# Patient Record
Sex: MALE | Race: WHITE | NOT HISPANIC OR LATINO | Employment: FULL TIME | URBAN - METROPOLITAN AREA
[De-identification: names, ages, dates, MRNs, and addresses within clinical notes are randomized per-mention and may not be internally consistent; named-entity substitution may affect disease eponyms.]

---

## 2024-09-15 ENCOUNTER — HOSPITAL ENCOUNTER (EMERGENCY)
Facility: HOSPITAL | Age: 24
Discharge: HOME | End: 2024-09-15
Attending: STUDENT IN AN ORGANIZED HEALTH CARE EDUCATION/TRAINING PROGRAM
Payer: COMMERCIAL

## 2024-09-15 VITALS
HEIGHT: 68 IN | WEIGHT: 154 LBS | HEART RATE: 56 BPM | TEMPERATURE: 98 F | RESPIRATION RATE: 20 BRPM | SYSTOLIC BLOOD PRESSURE: 129 MMHG | DIASTOLIC BLOOD PRESSURE: 100 MMHG | OXYGEN SATURATION: 96 % | BODY MASS INDEX: 23.34 KG/M2
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
## 2024-09-15 PROCEDURE — 63600175 PHARM REV CODE 636 W HCPCS: Performed by: STUDENT IN AN ORGANIZED HEALTH CARE EDUCATION/TRAINING PROGRAM

## 2024-09-15 PROCEDURE — 99284 EMERGENCY DEPT VISIT MOD MDM: CPT | Mod: 25

## 2024-09-15 PROCEDURE — 90471 IMMUNIZATION ADMIN: CPT | Performed by: STUDENT IN AN ORGANIZED HEALTH CARE EDUCATION/TRAINING PROGRAM

## 2024-09-15 PROCEDURE — 90715 TDAP VACCINE 7 YRS/> IM: CPT | Performed by: STUDENT IN AN ORGANIZED HEALTH CARE EDUCATION/TRAINING PROGRAM

## 2024-09-15 RX ORDER — AMOXICILLIN AND CLAVULANATE POTASSIUM 875; 125 MG/1; MG/1
1 TABLET, FILM COATED ORAL 2 TIMES DAILY
Qty: 14 TABLET | Refills: 0 | Status: SHIPPED | OUTPATIENT
Start: 2024-09-15 | End: 2024-09-22

## 2024-09-15 RX ADMIN — TETANUS TOXOID, REDUCED DIPHTHERIA TOXOID AND ACELLULAR PERTUSSIS VACCINE, ADSORBED 0.5 ML: 5; 2.5; 8; 8; 2.5 SUSPENSION INTRAMUSCULAR at 01:09

## 2024-09-15 NOTE — Clinical Note
"Godwin Gaytan" Ana was seen and treated in our emergency department on 9/15/2024.  He may return with no restrictions on 09/15/2024.  May return to full duty     Sincerely,      Delicia Fairchild RN RN    "

## 2024-09-15 NOTE — ED NOTES
[place] police notified that pt was being discharged at this time.  Instructed pt to call police department later today if they do not contact you.  Instructed that case number is [id number].  Pt verbalized understanding.

## 2024-09-15 NOTE — ED NOTES
Called to [place] [employer] about dog bite in [facility].  Spoke with Eve who said she would reach out to animal control and call ER back

## 2024-09-15 NOTE — ED PROVIDER NOTES
Encounter Date: 9/15/2024       History     Chief Complaint   Patient presents with    Animal Bite     Dog bite to left lower leg happened prior to arrival. Abrasion/scrap noted to back of patient's left lower leg.      Patient is a 23-year-old male no significant past medical history presented to the ER today due to a dog bite.  He states he works in [employer] and was doing a call at a house when a pit bull bit his leg.  Area of injury is left lower leg.  He states it grabbed his jeans and has a superficial abrasion to the lateral aspect of the left lower leg.  He is unsure when his last tetanus shot was and states that his work requires him to come in to be evaluated.  Animal control was contacted and the plan is for quarantine of the dog.  Patient states he was unsure of the dog's vaccination status.  Denies any complaints this time.      Review of patient's allergies indicates:  No Known Allergies  History reviewed. No pertinent past medical history.  History reviewed. No pertinent surgical history.  No family history on file.  Social History     Tobacco Use    Smoking status: Never    Smokeless tobacco: Never   Substance Use Topics    Alcohol use: Never    Drug use: Never     Review of Systems   Constitutional:  Negative for chills, fatigue and fever.   HENT:  Negative for congestion, sore throat and trouble swallowing.    Eyes:  Negative for pain and visual disturbance.   Respiratory:  Negative for cough, shortness of breath and wheezing.    Cardiovascular:  Negative for chest pain and palpitations.   Gastrointestinal:  Negative for abdominal pain, blood in stool, constipation, diarrhea, nausea and vomiting.   Genitourinary:  Negative for dysuria and hematuria.   Musculoskeletal:  Negative for back pain and myalgias.   Skin:  Positive for wound. Negative for rash.   Neurological:  Negative for seizures, syncope and headaches.   Psychiatric/Behavioral:  Negative for confusion. The patient is not  nervous/anxious.        Physical Exam     Initial Vitals [09/15/24 1254]   BP Pulse Resp Temp SpO2   (!) 129/100 (!) 56 20 98.1 °F (36.7 °C) 96 %      MAP       --         Physical Exam    Nursing note and vitals reviewed.  Constitutional: He appears well-developed and well-nourished. No distress.   HENT:   Head: Normocephalic and atraumatic.   Eyes: Conjunctivae and EOM are normal. Right eye exhibits no discharge. Left eye exhibits no discharge. No scleral icterus.   Neck: No tracheal deviation present.   Normal range of motion.  Cardiovascular:  Normal rate, regular rhythm and normal heart sounds.     Exam reveals no gallop and no friction rub.       No murmur heard.  Pulmonary/Chest: Breath sounds normal. No respiratory distress. He has no wheezes. He has no rhonchi. He has no rales.   Musculoskeletal:         General: No edema. Normal range of motion.      Cervical back: Normal range of motion.      Comments: Left leg:  Superficial abrasion noted to the left lower leg.  No puncture wounds or wounds it seemed to protrude through the dermis.  No bleeding on exam.  No hematoma formation appreciated.  Compartments soft.     Neurological: He is alert.   Skin: Skin is warm and dry. No rash and no abscess noted. No erythema. No pallor.   Psychiatric: His behavior is normal. Judgment normal.         ED Course   Procedures  Labs Reviewed - No data to display       Imaging Results    None          Medications   Tdap (BOOSTRIX) vaccine injection 0.5 mL (0.5 mLs Intramuscular Given 9/15/24 1310)     Medical Decision Making  Differentials:  Dog bite   Historian is the patient   23-year-old well-appearing male presents to the ER today with a dog bite.  Area of concern no evidence of puncture wound.  Seems to be a superficial abrasion.  No active bleeding.  Boostrix updated.  Dog will be quarantine and based on the rabies algorithm does not indicate the need for pep at this time.  I still however often patient this he states  the he will follow up with animal control for now.  Out of an abundance of caution Augmentin sent pharmacy.  All questions answered in layman's terms and return precautions were discussed    Risk  Prescription drug management.                                      Clinical Impression:  Final diagnoses:  [W54.0XXA] Dog bite, initial encounter (Primary)          ED Disposition Condition    Discharge Stable          ED Prescriptions       Medication Sig Dispense Start Date End Date Auth. Provider    amoxicillin-clavulanate 875-125mg (AUGMENTIN) 875-125 mg per tablet Take 1 tablet by mouth 2 (two) times daily. for 7 days 14 tablet 9/15/2024 9/22/2024 Yunior Elaine MD          Follow-up Information       Follow up With Specialties Details Why Contact Info    [facility] - Emergency Dept Emergency Medicine  If symptoms worsen [address] [facility] [id number] [phone]             Yunior Elaine MD  09/15/24 3027